# Patient Record
Sex: FEMALE | Race: WHITE
[De-identification: names, ages, dates, MRNs, and addresses within clinical notes are randomized per-mention and may not be internally consistent; named-entity substitution may affect disease eponyms.]

---

## 2018-01-20 ENCOUNTER — HOSPITAL ENCOUNTER (EMERGENCY)
Dept: HOSPITAL 17 - NEPD | Age: 63
Discharge: HOME | End: 2018-01-20
Payer: COMMERCIAL

## 2018-01-20 VITALS
RESPIRATION RATE: 14 BRPM | OXYGEN SATURATION: 99 % | TEMPERATURE: 98.2 F | SYSTOLIC BLOOD PRESSURE: 147 MMHG | HEART RATE: 64 BPM | DIASTOLIC BLOOD PRESSURE: 69 MMHG

## 2018-01-20 VITALS — WEIGHT: 147.71 LBS | BODY MASS INDEX: 27.18 KG/M2 | HEIGHT: 62 IN

## 2018-01-20 DIAGNOSIS — M79.671: Primary | ICD-10-CM

## 2018-01-20 DIAGNOSIS — E78.00: ICD-10-CM

## 2018-01-20 DIAGNOSIS — Z86.73: ICD-10-CM

## 2018-01-20 DIAGNOSIS — Z87.891: ICD-10-CM

## 2018-01-20 DIAGNOSIS — M19.90: ICD-10-CM

## 2018-01-20 PROCEDURE — 99283 EMERGENCY DEPT VISIT LOW MDM: CPT

## 2018-01-20 NOTE — PD
HPI


.


Foot pain


Chief Complaint:  Pain: Acute or Chronic


Time Seen by Provider:  10:46


Travel History


International Travel<30 days:  No


Contact w/Intl Traveler<30days:  No


Traveled to known affect area:  No





History of Present Illness


HPI


Patient presents with right foot pain.  It is atraumatic.  Onset was over a 

week ago.  Pain is exacerbated by standing and relieved by rest.  She describes 

a pounding pain which she rates 8/10.  Pain is unrelieved by over-the-counter 

Tylenol and Advil.  Patient reports that she was seen at urgent care 

approximately a week ago and had negative x-rays.  Pain has persisted and 

worsened causing her to present us today.  She has not seen her primary care 

provider.





[facility]


Past Medical History


Arthritis:  Yes


Blood Disorders:  No


Cancer:  No


Cardiovascular Problems:  No


High Cholesterol:  Yes


Chest Pain:  Yes


Cerebrovascular Accident:  Yes (JAN 2007)


Diabetes:  No


Endocrine:  No


Genitourinary:  No


Hepatitis:  No


Hiatal Hernia:  No


Immune Disorder:  No


Musculoskeletal:  Yes (ARTHRITIS IN HANDS)


Neurologic:  Yes (TIA X 2, LAST IN 2007)


Psychiatric:  Yes (CLAUSTROPHOBIC)


Reproductive:  No


Respiratory:  No


Thyroid Disease:  No


Tetanus Vaccination:  Unknown


Influenza Vaccination:  Yes





Past Surgical History


AICD:  No


Body Medical Devices:  SCREWS MONICO TOES


Gynecologic Surgery:  Yes (OVARIAN CYST SURGERY 1971)


Joint Replacement:  No


Pacemaker:  No


Other Surgery:  Yes





Social History


Alcohol Use:  No


Tobacco Use:  No (QUIT IN JAN)


Substance Use:  No





Allergies-Medications


(Allergen,Severity, Reaction):  


Coded Allergies:  


     No Known Allergies (Verified  Adverse Reaction, Unknown, 1/20/18)


Reported Meds & Prescriptions





Reported Meds & Active Scripts


Active


Nabumetone 500 Mg Tab 500 Mg PO BID








Review of Systems


Except as stated in HPI:  all other systems reviewed are Neg





Physical Exam


Narrative


GENERAL: Awake and alert and in no acute distress.  Using the FLACC behavior 

pain scale, her objective pain scale is 1/10.  She grimaced when I palpated her 

calcaneus.


SKIN: Warm and dry.  Normal skin color.


HEAD: Normocephalic/atraumatic.


EYES: Pupils are equal.  Extraocular movements are intact.


NECK: Normal range of motion.


CARDIOVASCULAR: Regular rate and rhythm.


RESPIRATORY: Nonlabored respirations.


MUSCULOSKELETAL: Right foot has no appreciable swelling.  She has tenderness to 

the lateral aspect of the calcaneus.  She also has some tenderness in the 

Achilles tendon.  Calf is nontender.  Log rolling of the hip causes no pain and 

movement of the knee causes no pain.


NEUROLOGICAL: Nonfocal.


PSYCHIATRIC: Appropriate mood and affect.





Data


Data


Last Documented VS





Vital Signs








  Date Time  Temp Pulse Resp B/P (MAP) Pulse Ox O2 Delivery O2 Flow Rate FiO2


 


1/20/18 10:47   17     


 


1/20/18 10:35 98.2 64  147/69 (95) 99   








Orders





 Orders


Ed Discharge Order (1/20/18 10:56)








MDM


Medical Decision Making


Medical Screen Exam Complete:  Yes


Emergency Medical Condition:  Yes


Differential Diagnosis


Differential diagnosis of joint pain includes but is not limited to arthritis, 

gout, sprain/strain, fracture, dislocation, bursitis


Narrative Course


This patient presents with atraumatic pain in her right heel.  The pain is in 

the lateral heel and not on the plantar aspect of the heel.  She has had a 

previous negative x-ray per her report.  She has no physical exam findings 

concerning for acute neurovascular problem.  She will be discharged with a 

prescription for Relafen and a referral to podiatry.





Diagnosis





 Primary Impression:  


 Right foot pain


Referrals:  


Guilherme Pappas DPM


3 days


Patient Instructions:  General Instructions, RICE Therapy (ED)


Departure Forms:  Tests/Procedures


Scripts


Nabumetone (Nabumetone) 500 Mg Tab


500 MG PO BID for Pain-Inflammation, #60 TAB 0 Refills


   Prov: Radha Hernandez MD         1/20/18


Disposition:  01 DISCHARGE HOME


Condition:  Stable











Radha Hernandez MD Jan 20, 2018 11:02

## 2018-04-17 ENCOUNTER — HOSPITAL ENCOUNTER (INPATIENT)
Dept: HOSPITAL 17 - PHED | Age: 63
LOS: 1 days | Discharge: HOME | DRG: 176 | End: 2018-04-18
Attending: HOSPITALIST | Admitting: HOSPITALIST
Payer: COMMERCIAL

## 2018-04-17 VITALS
SYSTOLIC BLOOD PRESSURE: 141 MMHG | RESPIRATION RATE: 16 BRPM | OXYGEN SATURATION: 95 % | HEART RATE: 67 BPM | DIASTOLIC BLOOD PRESSURE: 64 MMHG | TEMPERATURE: 97.7 F

## 2018-04-17 VITALS
OXYGEN SATURATION: 96 % | RESPIRATION RATE: 17 BRPM | DIASTOLIC BLOOD PRESSURE: 101 MMHG | TEMPERATURE: 97.6 F | SYSTOLIC BLOOD PRESSURE: 142 MMHG | HEART RATE: 69 BPM

## 2018-04-17 VITALS
HEART RATE: 64 BPM | OXYGEN SATURATION: 95 % | SYSTOLIC BLOOD PRESSURE: 108 MMHG | DIASTOLIC BLOOD PRESSURE: 65 MMHG | RESPIRATION RATE: 18 BRPM

## 2018-04-17 VITALS — HEIGHT: 62 IN | WEIGHT: 150.8 LBS | BODY MASS INDEX: 27.75 KG/M2

## 2018-04-17 VITALS
RESPIRATION RATE: 18 BRPM | OXYGEN SATURATION: 96 % | DIASTOLIC BLOOD PRESSURE: 68 MMHG | HEART RATE: 62 BPM | SYSTOLIC BLOOD PRESSURE: 114 MMHG

## 2018-04-17 VITALS
DIASTOLIC BLOOD PRESSURE: 66 MMHG | OXYGEN SATURATION: 93 % | RESPIRATION RATE: 16 BRPM | SYSTOLIC BLOOD PRESSURE: 120 MMHG | TEMPERATURE: 97 F | HEART RATE: 84 BPM

## 2018-04-17 VITALS
HEART RATE: 77 BPM | RESPIRATION RATE: 18 BRPM | DIASTOLIC BLOOD PRESSURE: 78 MMHG | SYSTOLIC BLOOD PRESSURE: 128 MMHG | OXYGEN SATURATION: 96 %

## 2018-04-17 VITALS — OXYGEN SATURATION: 96 %

## 2018-04-17 DIAGNOSIS — Z87.442: ICD-10-CM

## 2018-04-17 DIAGNOSIS — E87.6: ICD-10-CM

## 2018-04-17 DIAGNOSIS — M19.90: ICD-10-CM

## 2018-04-17 DIAGNOSIS — I26.99: Primary | ICD-10-CM

## 2018-04-17 DIAGNOSIS — R07.89: ICD-10-CM

## 2018-04-17 DIAGNOSIS — Z86.73: ICD-10-CM

## 2018-04-17 DIAGNOSIS — N39.0: ICD-10-CM

## 2018-04-17 DIAGNOSIS — M54.9: ICD-10-CM

## 2018-04-17 DIAGNOSIS — R11.0: ICD-10-CM

## 2018-04-17 DIAGNOSIS — G89.29: ICD-10-CM

## 2018-04-17 DIAGNOSIS — E78.00: ICD-10-CM

## 2018-04-17 LAB
ALBUMIN SERPL-MCNC: 3.8 GM/DL (ref 3.4–5)
ALP SERPL-CCNC: 90 U/L (ref 45–117)
ALT SERPL-CCNC: 49 U/L (ref 10–53)
AST SERPL-CCNC: 22 U/L (ref 15–37)
BASOPHILS # BLD AUTO: 0.1 TH/MM3 (ref 0–0.2)
BASOPHILS NFR BLD: 0.8 % (ref 0–2)
BILIRUB SERPL-MCNC: 0.5 MG/DL (ref 0.2–1)
BUN SERPL-MCNC: 20 MG/DL (ref 7–18)
CALCIUM SERPL-MCNC: 9.1 MG/DL (ref 8.5–10.1)
CHLORIDE SERPL-SCNC: 107 MEQ/L (ref 98–107)
COLOR UR: YELLOW
CREAT SERPL-MCNC: 0.87 MG/DL (ref 0.5–1)
EOSINOPHIL # BLD: 0.1 TH/MM3 (ref 0–0.4)
EOSINOPHIL NFR BLD: 0.9 % (ref 0–4)
ERYTHROCYTE [DISTWIDTH] IN BLOOD BY AUTOMATED COUNT: 13.1 % (ref 11.6–17.2)
GFR SERPLBLD BASED ON 1.73 SQ M-ARVRAT: 66 ML/MIN (ref 89–?)
GLUCOSE SERPL-MCNC: 117 MG/DL (ref 74–106)
GLUCOSE UR STRIP-MCNC: (no result) MG/DL
HCO3 BLD-SCNC: 29.1 MEQ/L (ref 21–32)
HCT VFR BLD CALC: 40.9 % (ref 35–46)
HGB BLD-MCNC: 13.9 GM/DL (ref 11.6–15.3)
HGB UR QL STRIP: (no result)
INR PPP: 1.1 RATIO
KETONES UR STRIP-MCNC: (no result) MG/DL
LEUKOCYTE ESTERASE UR QL STRIP: (no result) /HPF (ref 0–5)
LYMPHOCYTES # BLD AUTO: 2.3 TH/MM3 (ref 1–4.8)
LYMPHOCYTES NFR BLD AUTO: 24.1 % (ref 9–44)
MCH RBC QN AUTO: 30.8 PG (ref 27–34)
MCHC RBC AUTO-ENTMCNC: 34 % (ref 32–36)
MCV RBC AUTO: 90.4 FL (ref 80–100)
MONOCYTE #: 0.6 TH/MM3 (ref 0–0.9)
MONOCYTES NFR BLD: 6.3 % (ref 0–8)
MUCOUS THREADS #/AREA URNS LPF: (no result) /LPF
NEUTROPHILS # BLD AUTO: 6.4 TH/MM3 (ref 1.8–7.7)
NEUTROPHILS NFR BLD AUTO: 67.9 % (ref 16–70)
NITRITE UR QL STRIP: (no result)
PLATELET # BLD: 289 TH/MM3 (ref 150–450)
PMV BLD AUTO: 7 FL (ref 7–11)
PROT SERPL-MCNC: 7.7 GM/DL (ref 6.4–8.2)
PROTHROMBIN TIME: 11.4 SEC (ref 9.8–11.6)
RBC # BLD AUTO: 4.53 MIL/MM3 (ref 4–5.3)
RBC #/AREA URNS HPF: (no result) /HPF (ref 0–3)
SODIUM SERPL-SCNC: 142 MEQ/L (ref 136–145)
SP GR UR STRIP: (no result) (ref 1–1.03)
SQUAMOUS #/AREA URNS HPF: (no result) /HPF (ref 0–5)
URINE LEUKOCYTE ESTERASE: (no result)
WBC # BLD AUTO: 9.5 TH/MM3 (ref 4–11)
WHITE BLOOD CELL CLUMPS: (no result)

## 2018-04-17 PROCEDURE — 87086 URINE CULTURE/COLONY COUNT: CPT

## 2018-04-17 PROCEDURE — 85025 COMPLETE CBC W/AUTO DIFF WBC: CPT

## 2018-04-17 PROCEDURE — 93306 TTE W/DOPPLER COMPLETE: CPT

## 2018-04-17 PROCEDURE — 96374 THER/PROPH/DIAG INJ IV PUSH: CPT

## 2018-04-17 PROCEDURE — 81001 URINALYSIS AUTO W/SCOPE: CPT

## 2018-04-17 PROCEDURE — 80053 COMPREHEN METABOLIC PANEL: CPT

## 2018-04-17 PROCEDURE — 96375 TX/PRO/DX INJ NEW DRUG ADDON: CPT

## 2018-04-17 PROCEDURE — 83690 ASSAY OF LIPASE: CPT

## 2018-04-17 PROCEDURE — 96361 HYDRATE IV INFUSION ADD-ON: CPT

## 2018-04-17 PROCEDURE — 71275 CT ANGIOGRAPHY CHEST: CPT

## 2018-04-17 PROCEDURE — 85730 THROMBOPLASTIN TIME PARTIAL: CPT

## 2018-04-17 PROCEDURE — 85610 PROTHROMBIN TIME: CPT

## 2018-04-17 PROCEDURE — 74177 CT ABD & PELVIS W/CONTRAST: CPT

## 2018-04-17 RX ADMIN — Medication SCH ML: at 20:36

## 2018-04-17 NOTE — PD
HPI


Chief Complaint:  Abdominal Pain


Time Seen by Provider:  13:56


Travel History


International Travel<30 days:  No


Contact w/Intl Traveler<30days:  No


Traveled to known affect area:  No





History of Present Illness


HPI


This 62-year-old female is complaining of left flank pain.  Drove 14 hours in a 

car yesterday she is has chronic back pain.  She had an MRI which showed that 

she has bulging disks is and is going to see a doctor soon about this.  She 

started getting nauseated this morning while she was at work.  Around noon she 

started having left lower quadrant and left flank pain.  She has not had any 

dysuria.  She has no history of abdominal surgery.  She is a bit nauseated she 

is not aware of fever or chills





[facility]


Past Medical History


Arthritis:  Yes


Blood Disorders:  No


Cancer:  No


Cardiovascular Problems:  No


High Cholesterol:  Yes


Chest Pain:  Yes


Cerebrovascular Accident:  Yes (cva x2)


Diabetes:  No


Endocrine:  No


Genitourinary:  No


Hepatitis:  No


Hiatal Hernia:  No


Immune Disorder:  No


Musculoskeletal:  Yes (ARTHRITIS IN HANDS)


Neurologic:  Yes (TIA X 2, LAST IN 2007)


Psychiatric:  Yes (CLAUSTROPHOBIC)


Reproductive:  No


Respiratory:  No


Thyroid Disease:  No


Pregnant?:  Not Pregnant





Past Surgical History


AICD:  No


Body Medical Devices:  SCREWS MONICO TOES


Gynecologic Surgery:  Yes (OVARIAN CYST SURGERY 1971)


Joint Replacement:  No


Pacemaker:  No


Other Surgery:  Yes (SCREWS IN TOES)





Social History


Alcohol Use:  No


Tobacco Use:  No (QUIT IN JAN)


Substance Use:  No





Allergies-Medications


(Allergen,Severity, Reaction):  


Coded Allergies:  


     No Known Allergies (Verified  Adverse Reaction, Unknown, 4/17/18)


Reported Meds & Prescriptions





Reported Meds & Active Scripts


Active


No Active Prescriptions or Reported Medications    








Review of Systems


General / Constitutional:  No: Fever, Chills


Eyes:  No: Diploplia, Blurred Vision


HENT:  No: Headaches, Vertigo


Cardiovascular:  No: Chest Pain or Discomfort, Palpitations


Respiratory:  No: Cough, Shortness of Breath


Gastrointestinal:  Positive: Nausea, Abdominal Pain, No: Vomiting


Genitourinary:  No: Urgency, Frequency


Musculoskeletal:  No: Myalgias, Arthralgias


Skin:  No Rash


Neurologic:  No: Weakness


Hematologic/Lymphatic:  No: Easy Bruising





Physical Exam


Narrative


GENERAL: Well developed female


SKIN: Focused skin assessment warm/dry.


HEAD: Atraumatic. Normocephalic. 


EYES: Pupils equal and round. No scleral icterus. No injection or drainage. 


ENT: No nasal bleeding or discharge.  Mucous membranes pink and moist.


NECK: Trachea midline. No JVD. 


CARDIOVASCULAR: Regular rate and rhythm.  No murmur appreciated.


RESPIRATORY: No accessory muscle use. Clear to auscultation. Breath sounds 

equal bilaterally. 


GASTROINTESTINAL: Abdomen soft, non-tender, nondistended. Hepatic and splenic 

margins not palpable.  Site of pain is the left lower quadrant and left flank 

area.  Palpation does not appear to aggravate the pain very much


MUSCULOSKELETAL: No obvious deformities. No clubbing.  No cyanosis.  No edema. 


NEUROLOGICAL: Awake and alert. No obvious cranial nerve deficits.  Motor 

grossly within normal limits. Normal speech.


PSYCHIATRIC: Appropriate mood and affect; insight and judgment normal.





Data


Data


Last Documented VS





Vital Signs








  Date Time  Temp Pulse Resp B/P (MAP) Pulse Ox O2 Delivery O2 Flow Rate FiO2


 


4/17/18 13:21  77 18 128/78 (95) 96 Room Air  


 


4/17/18 12:59 97.7       








Orders





 Orders


Complete Blood Count With Diff (4/17/18 13:09)


Comprehensive Metabolic Panel (4/17/18 13:09)


Urinalysis - C+S If Indicated (4/17/18 13:09)


Iv Access Insert/Monitor (4/17/18 13:09)


Oxygen Administration (4/17/18 13:09)


Oximetry (4/17/18 13:09)


Lipase (4/17/18 13:09)


Sodium Chlor 0.9% 1000 Ml Inj (Ns 1000 M (4/17/18 14:00)


Ondansetron Inj (Zofran Inj) (4/17/18 14:00)


Ketorolac Inj (Toradol Inj) (4/17/18 14:00)


Potassium Chloride (Kcl) (4/17/18 14:00)


Sodium Chlor 0.9% 1000 Ml Inj (Ns 1000 M (4/17/18 15:15)


Ct Abd/Pel W Iv Contrast(Rout) (4/17/18 15:03)





Labs





Laboratory Tests








Test


  4/17/18


13:18


 


White Blood Count 9.5 TH/MM3 


 


Red Blood Count 4.53 MIL/MM3 


 


Hemoglobin 13.9 GM/DL 


 


Hematocrit 40.9 % 


 


Mean Corpuscular Volume 90.4 FL 


 


Mean Corpuscular Hemoglobin 30.8 PG 


 


Mean Corpuscular Hemoglobin


Concent 34.0 % 


 


 


Red Cell Distribution Width 13.1 % 


 


Platelet Count 289 TH/MM3 


 


Mean Platelet Volume 7.0 FL 


 


Neutrophils (%) (Auto) 67.9 % 


 


Lymphocytes (%) (Auto) 24.1 % 


 


Monocytes (%) (Auto) 6.3 % 


 


Eosinophils (%) (Auto) 0.9 % 


 


Basophils (%) (Auto) 0.8 % 


 


Neutrophils # (Auto) 6.4 TH/MM3 


 


Lymphocytes # (Auto) 2.3 TH/MM3 


 


Monocytes # (Auto) 0.6 TH/MM3 


 


Eosinophils # (Auto) 0.1 TH/MM3 


 


Basophils # (Auto) 0.1 TH/MM3 


 


CBC Comment DIFF FINAL 


 


Differential Comment  


 


Blood Urea Nitrogen 20 MG/DL 


 


Creatinine 0.87 MG/DL 


 


Random Glucose 117 MG/DL 


 


Total Protein 7.7 GM/DL 


 


Albumin 3.8 GM/DL 


 


Calcium Level 9.1 MG/DL 


 


Alkaline Phosphatase 90 U/L 


 


Aspartate Amino Transf


(AST/SGOT) 22 U/L 


 


 


Alanine Aminotransferase


(ALT/SGPT) 49 U/L 


 


 


Total Bilirubin 0.5 MG/DL 


 


Sodium Level 142 MEQ/L 


 


Potassium Level 3.4 MEQ/L 


 


Chloride Level 107 MEQ/L 


 


Carbon Dioxide Level 29.1 MEQ/L 


 


Anion Gap 6 MEQ/L 


 


Estimat Glomerular Filtration


Rate 66 ML/MIN 


 


 


Lipase 89 U/L 











[facility]


Medical Decision Making


Medical Screen Exam Complete:  Yes


Emergency Medical Condition:  Yes


Medical Record Reviewed:  Yes


Differential Diagnosis


Differential includes renal colic, diverticulitis, nonspecific abdominal pain


Narrative Course


White count is 9.5.  A CT scan has been ordered to further assess etiology of 

the pain





Diagnosis





 Primary Impression:  


 Abdominal pain


Scripts


No Active Prescriptions or Reported Meds











Ernie Bradley MD Apr 17, 2018 14:03

## 2018-04-17 NOTE — RADRPT
EXAM DATE/TIME:  04/17/2018 15:18 

 

[facility] COMPARISON:     

No previous studies available for comparison.

 

 

INDICATIONS :     

Left lower quadrant pain today with chronic bilateral lower back pain.

                      

 

IV CONTRAST:     

90 cc Omnipaque 350 (iohexol) IV 

 

 

ORAL CONTRAST:      

No oral contrast ingested.

                      

 

RADIATION DOSE:     

9.65 CTDIvol (mGy) 

 

 

MEDICAL HISTORY :     

Cerebrovascular disease.  

 

SURGICAL HISTORY :      

None. 

 

ENCOUNTER:      

Initial

 

ACUITY:      

1 day

 

PAIN SCALE:      

5/10

 

LOCATION:       

Left lower quadrant 

 

TECHNIQUE:     

Volumetric scanning of the abdomen and pelvis was performed.  Using automated exposure control and ad
justment of the mA and/or kV according to patient size, radiation dose was kept as low as reasonably 
achievable to obtain optimal diagnostic quality images.  DICOM format image data is available electro
nically for review and comparison.  

 

FINDINGS:     

 

LOWER LUNGS:     

Minimal bibasilar ground glass opacities right reflecting atelectasis. There are filling defects note
d in the segmental or right lower lobe pulmonary artery branches concerning for pulmonary embolism.

 

LIVER:     

Diffusely decreased hepatic density without volume loss, focal mass or intrahepatic ductal dilatation
. Gallbladder is unremarkable by CT.

 

SPLEEN:     

Normal size without lesion.

 

PANCREAS:     

Within normal limits.

 

KIDNEYS:     

There is mild prominence of the central renal collecting system with diffuse mild hydroureter extendi
ng to the UVJ. There is a punctate calcified density in the bladder near the UV junction which may re
flect a recently passed calculus. No additional radiopaque renal calculi are demonstrated. Kidneys ar
e otherwise unremarkable.

 

ADRENAL GLANDS:     

Within normal limits.

 

VASCULAR:     

Diffuse atherosclerotic calcifications of the infrarenal aorta without aneurysm or significant stenos
is.

 

BOWEL/MESENTERY:     

Sigmoid diverticulosis without significant inflammatory change to suggest diverticulitis. Appendix is
 visualized and normal in appearance. Bowel is unremarkable without evidence for obstruction. No pneu
matosis or free air. No drainable fluid collections or ascites.

 

ABDOMINAL WALL:     

Within normal limits.

 

RETROPERITONEUM:     

There is no lymphadenopathy. 

 

BLADDER:     

Again, punctate calcified density in the posterior left bladder near the UV junction. Bladder is othe
rwise unremarkable.

 

REPRODUCTIVE:     

Within normal limits.

 

INGUINAL:     

There is no lymphadenopathy or hernia. 

 

MUSCULOSKELETAL:     

Within normal limits for patient age. 

 

CONCLUSION:

     

1. Partially imaged filling defects in the segmental branches of the right lower lobe pulmonary arter
y concerning for pulmonary artery embolism. Correlation for pulmonary artery embolism risk factors is
 recommended. Recommend further evaluation with CT angiogram as clinically warranted.

2. Punctate calcified density in the bladder likely just beyond the UV junction with mild left-sided 
hydroureteronephrosis. Overall findings are most consistent with a recently passed calculus. No addit
ional radiopaque renal calculi.

3. Normal appendix. 

 

 Findings were personally discussed with Dr. Gerard.

 

 

 

 Antwan Harding MD on April 17, 2018 at 15:38           

Board Certified Radiologist.

 This report was verified electronically.

## 2018-04-17 NOTE — RADRPT
EXAM DATE/TIME:  04/17/2018 16:15 

 

[facility] COMPARISON:     

No previous studies available for comparison.

 

 

INDICATIONS :     

***Abnormal CT abdomen/pelvis done today.

                      

 

IV CONTRAST:     

50 cc Omnipaque 350 (iohexol) IV 

 

 

RADIATION DOSE:     

10.61 CTDIvol (mGy) 

 

 

MEDICAL HISTORY :     

Cerebrovascular disease.  

 

SURGICAL HISTORY :      

None. 

 

ENCOUNTER:      

Subsequent

 

ACUITY:      

1 day

 

PAIN SCALE:      

0/10

 

LOCATION:        

chest 

 

TECHNIQUE:     

Volumetric scanning of the chest was performed using a pulmonary embolism protocol MIP images were re
constructed.  Using automated exposure control and adjustment of the mA and/or kV according to patien
t size, radiation dose was kept as low as reasonably achievable to obtain optimal diagnostic quality 
images.   DICOM format image data is available electronically for review and comparison.  

 

Follow-up recommendations for detected pulmonary nodules are based at a minimum on nodule size and pa
tient risk factors according to Fleischner Society Guidelines.

 

FINDINGS:     

 

PULMONARY ARTERIES: 

Multiple filling defects are identified in the proximal lobar branches of both lungs.

 

LUNGS:     

Subsegmental air space disease is identified posteriorly in the right lower lobe.

 

PLEURAE:     

There is no pleural thickening or pleural effusion.

 

MEDIASTINUM:     

There is good visualization of the great vessels of the middle mediastinum.  No evidence of mediastin
al or hilar adenopathy/mass.

 

MUSCULOSKELETAL:     

Within normal limits for patient age.

 

MISCELLANEOUS:     

The visualized upper abdominal organs demonstrate no acute abnormality.

 

CONCLUSION:     

1. Bilateral pulmonary emboli

2. Subsegmental airspace disease right lower lobe

 

 

 

 

 Carroll Valente MD on April 17, 2018 at 16:29           

Board Certified Radiologist.

 This report was verified electronically.

## 2018-04-17 NOTE — HHI.HP
__________________________________________________





[facility]


Service


[facility]


Primary Care Physician


Non-Staff


Admission Diagnosis





Bilateral pulmonary embolism, nephrolithiasis


Diagnoses:  


Chief Complaint:  


abd pain


Travel History


International Travel<30 Days:  No


Contact w/Intl Traveler <30 Da:  No


Traveled to Known Affected Are:  No


History of Present Illness


Patient seen in ER for complaints of abdominal pain after a long car ride from 

[place] to [place].  This is a total of 10 hours.  She has some dysuria and 

frequency.  She appears to have urinary tract infection has spastic renal 

stone.  In evaluation of this patient's abdominal complaint patient did have 

findings of pulmonary arterial thrombosis.  Patient has never had a PE or 

venous normal embolic event but has had several TIAs in the past.  She has 

family history of cancer and a personal history of cancer.  Have just written 

this long 10 hour ride in a car certainly does put her at higher risk for 

thromboembolic event.  For this reason the in medical team was called for 

further admission orders and the patient plan of care discussed with her and 

her sister at bedside.  She will be admitted to the hospital for further 

treatment of acute pulmonary embolism.  She is not short of breath.  She is 

normotensive.  She has a vague chest discomfort.





Review of Systems


Constitutional:  DENIES: Diaphoretic episodes, Fatigue, Fever, Weight gain, 

Weight loss, Chills, Dizziness, Change in appetite, Night Sweats


Endocrine:  DENIES: Abnorml menstrual pattern, Heat/cold intolerance, Polydipsia

, Polyuria, Polyphagia


Eyes:  DENIES: Blurred vision, Diplopia, Eye inflammation, Eye pain, Vision loss

, Photosensitivity, Double Vision


Ears, nose, mouth, throat:  DENIES: Tinnitus, Hearing loss, Vertigo, Nasal 

discharge, Oral lesions, Throat pain, Hoarseness, Ear Pain, Running Nose, 

Epistaxis, Sinus Pain, Toothache, Odynophagia


Respiratory:  DENIES: Apneas, Cough, Snoring, Wheezing, Hemoptysis, Sputum 

production, Shortness of breath


Cardiovascular:  DENIES: Chest pain, Palpitations, Syncope, Dyspnea on Exertion

, PND, Lower Extremity Edema, Orthopnea, Claudication


Gastrointestinal:  COMPLAINS OF: Abdominal pain, DENIES: Black stools, Bloody 

stools, Constipation, Diarrhea, Nausea, Vomiting, Difficulty Swallowing, 

Anorexia


Genitourinary:  DENIES: Abnormal vaginal bleeding, Dysmenorrhea, Dyspareunia, 

Sexual dysfunction, Urinary frequency, Urinary incontinence, Urgency, Hematuria

, Dysuria, Nocturia, Vaginal discharge


Musculoskeletal:  DENIES: Joint pain, Muscle aches, Stiffness, Joint Swelling, 

Back pain, Neck pain


Integumentary:  DENIES: Abnormal pigmentation, Pruritus, Rash, Nail changes, 

Breast masses, Breast skin changes, Nipple discharge


Hematologic/lymphatic:  DENIES: Bruising, Lymphadenopathy


Immunologic/allergic:  DENIES: Eczema, Urticaria


Neurologic:  DENIES: Abnormal gait, Headache, Localized weakness, Paresthesias, 

Seizures, Speech Problems, Tremor, Poor Balance


Psychiatric:  DENIES: Anxiety, Confusion, Mood changes, Depression, 

Hallucinations, Agitation, Suicidal Ideation, Homicidal Ideation, Delusions


Except as stated in HPI:  all other systems reviewed are Neg





Past Family Social History


Past Medical History


tia, no residual


OA


Past Surgical History


ovarian cyst


foot


Reported Medications


none


Allergies:  


Coded Allergies:  


     No Known Allergies (Verified  Adverse Reaction, Unknown, 18)


Active Ordered Medications


reviewed in the EMR


Family History


multiple malignancies


Social History


NO tobacco, etoh


single





Physical Exam


Vital Signs





Vital Signs








  Date Time  Temp Pulse Resp B/P (MAP) Pulse Ox O2 Delivery O2 Flow Rate FiO2


 


18 15:40  64 18 108/65 (79) 95 Room Air  


 


18 13:21  77 18 128/78 (95) 96 Room Air  


 


18 13:20     96 Room Air  


 


18 13:20     96 Room Air  


 


18 12:59 97.7 67 16 141/64 (89) 95   








Physical Exam


GENERAL: This is a well-nourished, well-developed patient, in no apparent 

distress.


SKIN: No rashes, ecchymoses or lesions. Cool and dry.


HEAD: Atraumatic. Normocephalic. No temporal or scalp tenderness.


EYES: Pupils equal round and reactive. Extraocular motions intact. No scleral 

icterus. No injection or drainage. 


ENT: Nose without bleeding, purulent drainage or septal hematoma. Throat 

without erythema, tonsillar hypertrophy or exudate. Uvula midline. Airway 

patent.


NECK: Trachea midline. No JVD or lymphadenopathy. Supple, nontender, no 

meningeal signs.


CARDIOVASCULAR: Regular rate and rhythm without murmurs, gallops, or rubs. 


RESPIRATORY: Clear to auscultation. Breath sounds equal bilaterally. No wheezes

, rales, or rhonchi.  


GASTROINTESTINAL: Abdomen soft, non-tender, nondistended. No hepato-splenomegaly

, or palpable masses. No guarding.


MUSCULOSKELETAL: Extremities without clubbing, cyanosis, or edema. No joint 

tenderness, effusion, or edema noted. No calf tenderness. Negative Homans sign 

bilaterally.


NEUROLOGICAL: Awake and alert. Cranial nerves II through XII intact.  Motor and 

sensory grossly within normal limits. Five out of 5 muscle strength in all 

muscle groups.  Normal speech.


Laboratory





Laboratory Tests








Test


  18


13:18 18


15:40


 


White Blood Count 9.5  


 


Red Blood Count 4.53  


 


Hemoglobin 13.9  


 


Hematocrit 40.9  


 


Mean Corpuscular Volume 90.4  


 


Mean Corpuscular Hemoglobin 30.8  


 


Mean Corpuscular Hemoglobin


Concent 34.0 


  


 


 


Red Cell Distribution Width 13.1  


 


Platelet Count 289  


 


Mean Platelet Volume 7.0  


 


Neutrophils (%) (Auto) 67.9  


 


Lymphocytes (%) (Auto) 24.1  


 


Monocytes (%) (Auto) 6.3  


 


Eosinophils (%) (Auto) 0.9  


 


Basophils (%) (Auto) 0.8  


 


Neutrophils # (Auto) 6.4  


 


Lymphocytes # (Auto) 2.3  


 


Monocytes # (Auto) 0.6  


 


Eosinophils # (Auto) 0.1  


 


Basophils # (Auto) 0.1  


 


CBC Comment DIFF FINAL  


 


Differential Comment   


 


Blood Urea Nitrogen 20  


 


Creatinine 0.87  


 


Random Glucose 117  


 


Total Protein 7.7  


 


Albumin 3.8  


 


Calcium Level 9.1  


 


Alkaline Phosphatase 90  


 


Aspartate Amino Transf


(AST/SGOT) 22 


  


 


 


Alanine Aminotransferase


(ALT/SGPT) 49 


  


 


 


Total Bilirubin 0.5  


 


Sodium Level 142  


 


Potassium Level 3.4  


 


Chloride Level 107  


 


Carbon Dioxide Level 29.1  


 


Anion Gap 6  


 


Estimat Glomerular Filtration


Rate 66 


  


 


 


Lipase 89  


 


Urine Color  YELLOW 


 


Urine Turbidity  CLEAR 


 


Urine pH  6.0 


 


Urine Specific Gravity


  


  LESS/EQUAL


1.005


 


Urine Protein  NEG 


 


Urine Glucose (UA)  NEG 


 


Urine Ketones  TRACE 


 


Urine Occult Blood  LARGE 


 


Urine Nitrite  NEG 


 


Urine Bilirubin  NEG 


 


Urine Urobilinogen  0.2 


 


Urine Leukocyte Esterase  NEG 


 


Urine RBC  15-19 


 


Urine WBC  6-8 


 


Urine WBC Clumps  FEW 


 


Urine Squamous Epithelial


Cells 


  0-5 


 


 


Urine Mucus  MOD 


 


Microscopic Urinalysis Comment


  


  CULTURE


INDICATED














 Date/Time


Source Procedure


Growth Status


 


 


 18 15:40


Urine Clean Catch Urine Culture


Pending Received








Result Diagram:  


18 1318                                                                   

             18 1318





Imaging





Last Impressions








Abdomen/Pelvis CT 18 1503 Signed





Impressions: 





 Service Date/Time:  2018 15:18 - CONCLUSION:        1. 





 Partially imaged filling defects in the segmental branches of the right lower 





 lobe pulmonary artery concerning for pulmonary artery embolism. Correlation 

for 





 pulmonary artery embolism risk factors is recommended. Recommend further 





 evaluation with CT angiogram as clinically warranted. 2. Punctate calcified 





 density in the bladder likely just beyond the UV junction with mild left-sided 





 hydroureteronephrosis. Overall findings are most consistent with a recently 





 passed calculus. No additional radiopaque renal calculi. 3. Normal appendix.  

  





 Findings were personally discussed with Dr. Gerard.     Antwan Harding MD 


 


CT Angiography 18 0000 Signed





Impressions: 





 Service Date/Time:  2018 16:15 - CONCLUSION:  1. Bilateral 





 pulmonary emboli 2. Subsegmental airspace disease right lower lobe      Carroll Valente MD 











Caprini VTE Risk Assessment


Caprini VTE Risk Assessment:  Mod/High Risk (score >= 2)


VTE Pharm Contraindication:  Coagulopathy,INR elevated


Caprini Risk Assessment Model











 Point Value = 1          Point Value = 2  Point Value = 3  Point Value = 5


 


Age 41-60


Minor surgery


BMI > 25 kg/m2


Swollen legs


Varicose veins


Pregnancy or postpartum


History of unexplained or recurrent


   spontaneous 


Oral contraceptives or hormone


   replacement


Sepsis (< 1 month)


Serious lung disease, including


   pneumonia (< 1 month)


Abnormal pulmonary function


Acute myocardial infarction


Congestive heart failure (< 1 month)


History of inflammatory bowel disease


Medical patient at bed rest Age 61-74


Arthroscopic surgery


Major open surgery (> 45 min)


Laparoscopic surgery (> 45 min)


Malignancy


Confined to bed (> 72 hours)


Immobilizing plaster cast


Central venous access Age >= 75


History of VTE


Family history of VTE


Factor V Leiden


Prothrombin [id number]


Lupus anticoagulant


Anticardiolipin antibodies


Elevated serum homocysteine


Heparin-induced thrombocytopenia


Other congenital or acquired


   thrombophilia Stroke (< 1 month)


Elective arthroplasty


Hip, pelvis, or leg fracture


Acute spinal cord injury (< 1 month)








Prophylaxis Regimen











   Total Risk


Factor Score Risk Level Prophylaxis Regimen


 


0-1      Low Early ambulation


 


2 Moderate Order ONE of the following:


*Sequential Compression Device (SCD)


*Heparin 5000 units SQ BID


 


3-4 Higher Order ONE of the following medications:


*Heparin 5000 units SQ TID


*Enoxaparin/Lovenox 40 mg SQ daily (WT < 150 kg, CrCl > 30 mL/min)


*Enoxaparin/Lovenox 30 mg SQ daily (WT < 150 kg, CrCl > 10-29 mL/min)


*Enoxaparin/Lovenox 30 mg SQ BID (WT < 150 kg, CrCl > 30 mL/min)


AND/OR


*Sequential Compression Device (SCD)


 


5 or more Highest Order ONE of the following medications:


*Heparin 5000 units SQ TID (Preferred with Epidurals)


*Enoxaparin/Lovenox 40 mg SQ daily (WT < 150 kg, CrCl > 30 mL/min)


*Enoxaparin/Lovenox 30 mg SQ daily (WT < 150 kg, CrCl > 10-29 mL/min)


*Enoxaparin/Lovenox 30 mg SQ BID (WT < 150 kg, CrCl > 30 mL/min)


AND


*Sequential Compression Device (SCD)











Assessment and Plan


Problem List:  


(1) Bilateral pulmonary embolism


ICD Code:  I26.99 - Other pulmonary embolism without acute cor pulmonale


Status:  Acute


Plan:  lovenox x 24 hrs


likely Novel anticoagulant





(2) Renal calculus, left


ICD Code:  N20.0 - Calculus of kidney


Status:  Acute


Plan:  recently passed


stable


Rx uti iv rocephin








Physician Certification


2 Midnight Certification Type:  Admission for Inpatient Services


Order for Inpatient Services


The services are ordered in accordance with Medicare regulations or non-

Medicare payer requirements, as applicable.  In the case of services not 

specified as inpatient-only, they are appropriately provided as inpatient 

services in accordance with the 2-midnight benchmark.


Estimated LOS (days):  2


2 days is the estimated time the patient will need to remain in the hospital, 

assuming treatment plan goals are met and no additional complications.


Post-Hospital Plan:  Shelbie Atkinson MD 2018 17:13

## 2018-04-17 NOTE — PD
Physical Exam


Narrative


Received sign out from previous provider to follow up with CT a/p and 

reevaluate.





61yo F here with left flank pain.  Labs reviewed, no leukocytosis.  H/H normal.

  CMP unremarkable except mild hypokalemia at 3.4 which was replaced by 

previous team. Lipase normal.  Pt given zofran, toradol and NS IVF.  CT a/p 

showed partially imaged filling defects in segmental branches of right lower 

lobe pulmonary artery concerning for PE.  I discussed with radiologist and he 

recommends CT angio to further evaluate for PE and hydration.  Pt is receiving 

a second liter of NS IVF.  CT also showed punctate calcified density in bladder 

likely just beyond UV junction with mild left hydroureteronephrosis.  

Consistent with recently passed calculus.  UA showed large blood.  WBC 6-8.  Pt 

given ceftriaxone.  Pt reevaluated at bedside and pain and nausea has improved.

  Pt is saturating at 94% on RA and denies any chest pain or sob.  Denies any 

new bilateral leg swelling or pain.  Pt did just come back from a 10 hour car 

ride from [place] last night and has risk factors so CT angio ordered.  CT 

angio showed bilateral PE.  Pt is hemodynamically stable and given lovenox.  

Discussed with Dr. Lamb and accepted to her service.





Data


Data


Last Documented VS





Vital Signs








  Date Time  Temp Pulse Resp B/P (MAP) Pulse Ox O2 Delivery O2 Flow Rate FiO2


 


4/17/18 15:40  64 18 108/65 (79) 95 Room Air  


 


4/17/18 12:59 97.7       








Orders





 Orders


Complete Blood Count With Diff (4/17/18 13:09)


Comprehensive Metabolic Panel (4/17/18 13:09)


Urinalysis - C+S If Indicated (4/17/18 13:09)


Iv Access Insert/Monitor (4/17/18 13:09)


Oxygen Administration (4/17/18 13:09)


Oximetry (4/17/18 13:09)


Lipase (4/17/18 13:09)


Sodium Chlor 0.9% 1000 Ml Inj (Ns 1000 M (4/17/18 14:00)


Ondansetron Inj (Zofran Inj) (4/17/18 14:00)


Ketorolac Inj (Toradol Inj) (4/17/18 14:00)


Potassium Chloride (Kcl) (4/17/18 14:00)


Sodium Chlor 0.9% 1000 Ml Inj (Ns 1000 M (4/17/18 15:15)


Ct Abd/Pel W Iv Contrast(Rout) (4/17/18 15:03)


Iohexol 350 Inj (Omnipaque 350 Inj) (4/17/18 15:26)


Ct Pulmonary Angiogram (4/17/18 )


Sodium Chlor 0.9% 1000 Ml Inj (Ns 1000 M (4/17/18 16:00)


Urine Culture (4/17/18 15:40)


Iohexol 350 Inj (Omnipaque 350 Inj) (4/17/18 16:23)


Ceftriaxone Inj (Rocephin Inj) (4/17/18 16:45)


Sodium Chlor 0.9% 1000 Ml Inj (Ns 1000 M (4/17/18 16:45)


Enoxaparin Inj (Lovenox Inj) (4/17/18 17:00)


Admit Order (Ed Use Only) (4/17/18 16:55)





Labs





Laboratory Tests








Test


  4/17/18


13:18 4/17/18


15:40


 


White Blood Count 9.5 TH/MM3  


 


Red Blood Count 4.53 MIL/MM3  


 


Hemoglobin 13.9 GM/DL  


 


Hematocrit 40.9 %  


 


Mean Corpuscular Volume 90.4 FL  


 


Mean Corpuscular Hemoglobin 30.8 PG  


 


Mean Corpuscular Hemoglobin


Concent 34.0 % 


  


 


 


Red Cell Distribution Width 13.1 %  


 


Platelet Count 289 TH/MM3  


 


Mean Platelet Volume 7.0 FL  


 


Neutrophils (%) (Auto) 67.9 %  


 


Lymphocytes (%) (Auto) 24.1 %  


 


Monocytes (%) (Auto) 6.3 %  


 


Eosinophils (%) (Auto) 0.9 %  


 


Basophils (%) (Auto) 0.8 %  


 


Neutrophils # (Auto) 6.4 TH/MM3  


 


Lymphocytes # (Auto) 2.3 TH/MM3  


 


Monocytes # (Auto) 0.6 TH/MM3  


 


Eosinophils # (Auto) 0.1 TH/MM3  


 


Basophils # (Auto) 0.1 TH/MM3  


 


CBC Comment DIFF FINAL  


 


Differential Comment   


 


Blood Urea Nitrogen 20 MG/DL  


 


Creatinine 0.87 MG/DL  


 


Random Glucose 117 MG/DL  


 


Total Protein 7.7 GM/DL  


 


Albumin 3.8 GM/DL  


 


Calcium Level 9.1 MG/DL  


 


Alkaline Phosphatase 90 U/L  


 


Aspartate Amino Transf


(AST/SGOT) 22 U/L 


  


 


 


Alanine Aminotransferase


(ALT/SGPT) 49 U/L 


  


 


 


Total Bilirubin 0.5 MG/DL  


 


Sodium Level 142 MEQ/L  


 


Potassium Level 3.4 MEQ/L  


 


Chloride Level 107 MEQ/L  


 


Carbon Dioxide Level 29.1 MEQ/L  


 


Anion Gap 6 MEQ/L  


 


Estimat Glomerular Filtration


Rate 66 ML/MIN 


  


 


 


Lipase 89 U/L  


 


Urine Color  YELLOW 


 


Urine Turbidity  CLEAR 


 


Urine pH  6.0 


 


Urine Specific Gravity


  


  LESS/EQUAL


1.005


 


Urine Protein  NEG mg/dL 


 


Urine Glucose (UA)  NEG mg/dL 


 


Urine Ketones  TRACE mg/dL 


 


Urine Occult Blood  LARGE 


 


Urine Nitrite  NEG 


 


Urine Bilirubin  NEG 


 


Urine Urobilinogen  0.2 MG/DL 


 


Urine Leukocyte Esterase  NEG 


 


Urine RBC  15-19 /hpf 


 


Urine WBC  6-8 /hpf 


 


Urine WBC Clumps  FEW 


 


Urine Squamous Epithelial


Cells 


  0-5 /hpf 


 


 


Urine Mucus  MOD /lpf 


 


Microscopic Urinalysis Comment


  


  CULTURE


INDICATED











MDM


Supervised Visit with AMANDA:  No


Critical Care Narrative


Aggregate critical care time was 40 minutes. Time to perform other separately 

billable procedures was not  


included in the critical care time. My time did not include minutes spent 

treating any other patients simultaneously or on  


activities that did not directly contribute to the patient's treatment.  





The services I provided to this patient were to treat and/or prevent clinically 

significant deterioration that could result  


in:  cardiovascular collapse or death.





I provided critical care services requiring my management, as noted below:


Chart data review, documentation time, medication orders and management, vital 

sign assessments/reviewing monitor data,  


ordering and reviewing lab tests, ordering and interpreting/reviewing x-rays 

and diagnostic studies, care of the patient  


and discussion of the patient with the admitting physicians.


Diagnosis





 Primary Impression:  


 Bilateral pulmonary embolism


 Additional Impression:  


 Renal calculus, left





Admitting Information


Admitting Physician Requests:  Admit


Scripts


No Active Prescriptions or Reported Meds











Cynthia Gerard DO Apr 17, 2018 15:33

## 2018-04-18 VITALS
DIASTOLIC BLOOD PRESSURE: 66 MMHG | SYSTOLIC BLOOD PRESSURE: 121 MMHG | RESPIRATION RATE: 20 BRPM | OXYGEN SATURATION: 95 % | HEART RATE: 69 BPM | TEMPERATURE: 98.7 F

## 2018-04-18 VITALS
HEART RATE: 63 BPM | RESPIRATION RATE: 19 BRPM | TEMPERATURE: 96.9 F | DIASTOLIC BLOOD PRESSURE: 66 MMHG | OXYGEN SATURATION: 95 % | SYSTOLIC BLOOD PRESSURE: 122 MMHG

## 2018-04-18 VITALS
TEMPERATURE: 98.5 F | SYSTOLIC BLOOD PRESSURE: 107 MMHG | HEART RATE: 67 BPM | OXYGEN SATURATION: 93 % | DIASTOLIC BLOOD PRESSURE: 60 MMHG | RESPIRATION RATE: 18 BRPM

## 2018-04-18 VITALS — HEART RATE: 74 BPM

## 2018-04-18 VITALS
DIASTOLIC BLOOD PRESSURE: 66 MMHG | HEART RATE: 62 BPM | TEMPERATURE: 97.8 F | OXYGEN SATURATION: 94 % | RESPIRATION RATE: 18 BRPM | SYSTOLIC BLOOD PRESSURE: 123 MMHG

## 2018-04-18 VITALS — HEART RATE: 59 BPM

## 2018-04-18 RX ADMIN — Medication SCH ML: at 09:13

## 2018-04-18 NOTE — HHI.DCPOC
Discharge Care Plan


Diagnosis:  


(1) Renal calculus, left


(2) Bilateral pulmonary embolism


Goals to Promote Your Health


* To prevent worsening of your condition and complications


* To maintain your health at the optimal level


Directions to Meet Your Goals


*** Take your medications as prescribed


*** Follow your dietary instruction


*** Follow activity as directed








*** Keep your appointments as scheduled


*** Take your immunizations and boosters as scheduled


*** If your symptoms worsen call your PCP, if no PCP go to Urgent Care Center 

or Emergency Room***


*** Smoking is Dangerous to Your Health. Avoid second hand smoke***


***Call the 24-hour hour crisis hotline for domestic abuse at 1-532.576.3413***











Shelbie Lamb MD Apr 18, 2018 11:45

## 2018-04-18 NOTE — HHI.PR
Subjective


Remarks


Patient seen today in follow-up for pulmonary embolism.  No chest pain or 

shortness of breath.  Patient doing well with this issue however did say she 

had some dysuria and thinks she had passed the stone





Objective


Vitals





Vital Signs








  Date Time  Temp Pulse Resp B/P (MAP) Pulse Ox O2 Delivery O2 Flow Rate FiO2


 


4/18/18 08:00 98.7 69 20 121/66 (84) 95   


 


4/18/18 04:00 97.8 62 18 123/66 (85) 94   


 


4/18/18 00:00 98.5 67 18 107/60 (76) 93   


 


4/17/18 20:00  84      


 


4/17/18 20:00 97.0 87 16 120/66 (84) 93   


 


4/17/18 18:00 97.6 69 17 142/101 (115) 96   


 


4/17/18 17:45        


 


4/17/18 17:16  62 18 114/68 (83) 96 Room Air  


 


4/17/18 15:40  64 18 108/65 (79) 95 Room Air  


 


4/17/18 13:21  77 18 128/78 (95) 96 Room Air  


 


4/17/18 13:20     96 Room Air  


 


4/17/18 13:20     96 Room Air  


 


4/17/18 12:59 97.7 67 16 141/64 (89) 95   














I/O      


 


 4/17/18 4/17/18 4/17/18 4/18/18 4/18/18 4/18/18





 07:00 15:00 23:00 07:00 15:00 23:00


 


Intake Total   3100 ml 240 ml 240 ml 


 


Balance   3100 ml 240 ml 240 ml 


 


      


 


Intake Oral    240 ml 240 ml 


 


IV Total   3100 ml   


 


# Voids   1 1  








Result Diagram:  


4/17/18 1318                                                                   

             4/17/18 1318





Objective Remarks


GENERAL: This is a well-nourished, well-developed patient, in no apparent 

distress.


CARDIOVASCULAR: Regular rate and rhythm without murmurs, gallops, or rubs. 


RESPIRATORY: Clear to auscultation. Breath sounds equal bilaterally. No wheezes

, rales, or rhonchi.  


GASTROINTESTINAL: Abdomen soft, non-tender, nondistended. Normal active bowel 

sounds


MUSCULOSKELETAL: Extremities without clubbing, cyanosis, or edema.


NEURO:  Alert & Oriented x4 to person, place, time, situation.  Moves all ext x4





A/P


Problem List:  


(1) Bilateral pulmonary embolism


ICD Code:  I26.99 - Other pulmonary embolism without acute cor pulmonale


Status:  Acute


Plan:  Start Xarelto


Echo pending





(2) Renal calculus, left


ICD Code:  N20.0 - Calculus of kidney


Status:  Acute


Plan:  recently passed


stable


Rx uti iv rocephin





Discharge Planning


Pending echo, likely discharge home


Diet regular


Activity unrestricted


Follow-up PCP in 1 week











Shelbie Lamb MD Apr 18, 2018 11:44

## 2018-04-18 NOTE — ECHRPT
Indication:   Chest pain, unspecified

 

 CONCLUSIONS

 The left ventricular systolic function is normal with an estimated ejection fraction of 55%. 

 Wall thickness is normal. 

 Normal left ventricular size. 

 There is mild tricuspid valve regurgitation. 

 The estimated pulmonary arterial pressure is 34 mmHg.

 

 BP:        /         HR:                          Rhythm:           Sinus

 

 MEASUREMENTS  (Male / Female) Normal Values       Technical Quality:Good

 2D ECHO

 LV Diastolic Diameter PLAX        4.0 cm                4.2 - 5.9 / 3.9 - 5.3 cm

 LV Systolic Diameter PLAX         2.8 cm                

 IVS Diastolic Thickness           0.9 cm                0.6 - 1.0 / 0.6 - 0.9 cm

 LVPW Diastolic Thickness          0.9 cm                0.6 - 1.0 / 0.6 - 0.9 cm

 LV Relative Wall Thickness        0.5                   

 LVOT Diameter                     1.8 cm                

 

 M-MODE

 Aortic Root Diameter MM           3.0 cm                

 LA Systolic Diameter MM           3.3 cm                

 LA Ao Ratio MM                    1.1                   

 AV Cusp Separation MM             2.0 cm                

 

 DOPPLER

 AV Peak Velocity                  144.0 cm/s            

 AV Peak Gradient                  8.3 mmHg              

 LVOT Peak Velocity                87.4 cm/s             

 LVOT Peak Gradient                3.1 mmHg              

 AV Area Cont Eq pk                1.5 cm               

 Mitral E Point Velocity           85.9 cm/s             

 Mitral A Point Velocity           72.1 cm/s             

 Mitral E to A Ratio               1.2                   

 LV E' Lateral Velocity            9.2 cm/s              

 Mitral E to LV E' Lateral Ratio   9.4                   

 LV E' Septal Velocity             8.1 cm/s              

 Mitral E to LV E' Septal Ratio    10.6                  

 TR Peak Velocity                  246.0 cm/s            

 TR Peak Gradient                  24.2 mmHg             

 Right Atrial Pressure             10.0 mmHg             

 Pulmonary Artery Systolic Pressu  34.2 mmHg             

 Right Ventricular Systolic Press  34.2 mmHg             

 PV Peak Velocity                  96.4 cm/s             

 PV Peak Gradient                  3.7 mmHg              

 

 

 FINDINGS

 

 LEFT VENTRICLE

 The left ventricular systolic function is normal with an estimated ejection fraction of 55%. 

 Wall thickness is normal. 

 Normal left ventricular size. 

 

 RIGHT VENTRICLE

 Normal right ventricular size and systolic function.  

 

 LEFT ATRIUM

 The left atrial size is normal.  

 

 RIGHT ATRIUM

 The right atrial size is normal.  

 

 ATRIAL SEPTUM

 Normal atrial septal thickness without atrial level shunting by limited color doppler interrogation.
  

 

 AORTA

 The aortic root and proximal ascending aorta are normal in size on limited imaging.  

 

 MITRAL VALVE

 Structurally normal mitral valve. No mitral valve stenosis or regurgitation.  

 

 AORTIC VALVE

 Trileaflet aortic valve. No aortic valve stenosis or regurgitation.  

 

 TRICUSPID VALVE

 There is mild tricuspid valve regurgitation. 

 The estimated pulmonary arterial pressure is 34.2 mmHg. 

 

 PULMONARY VALVE

 No pulmonary valve regurgitation or stenosis.  

 

 VESSELS

 The inferior vena cava is normal in size.  

 

 PERICARDIUM

 No pericardial effusion.  

 

 

 

 

  Lupis Ruiz MD, FACC

  (Electronically Signed)

  Final Date:18 April 2018 18:04

## 2022-02-22 ENCOUNTER — APPOINTMENT (RX ONLY)
Dept: URBAN - METROPOLITAN AREA CLINIC 52 | Facility: CLINIC | Age: 67
Setting detail: DERMATOLOGY
End: 2022-02-22

## 2022-02-22 DIAGNOSIS — D22 MELANOCYTIC NEVI: ICD-10-CM

## 2022-02-22 DIAGNOSIS — L20.89 OTHER ATOPIC DERMATITIS: ICD-10-CM | Status: WORSENING

## 2022-02-22 PROBLEM — L20.84 INTRINSIC (ALLERGIC) ECZEMA: Status: ACTIVE | Noted: 2022-02-22

## 2022-02-22 PROBLEM — D22.5 MELANOCYTIC NEVI OF TRUNK: Status: ACTIVE | Noted: 2022-02-22

## 2022-02-22 PROCEDURE — ? FULL BODY SKIN EXAM - DECLINED

## 2022-02-22 PROCEDURE — 99204 OFFICE O/P NEW MOD 45 MIN: CPT

## 2022-02-22 PROCEDURE — ? COUNSELING

## 2022-02-22 PROCEDURE — ? PRESCRIPTION

## 2022-02-22 PROCEDURE — ? ADDITIONAL NOTES

## 2022-02-22 PROCEDURE — ? TREATMENT REGIMEN

## 2022-02-22 RX ORDER — TRIAMCINOLONE ACETONIDE 1 MG/G
1 CREAM TOPICAL BID
Qty: 80 | Refills: 2 | Status: ERX

## 2022-02-22 ASSESSMENT — LOCATION SIMPLE DESCRIPTION DERM
LOCATION SIMPLE: LEFT POSTERIOR UPPER ARM
LOCATION SIMPLE: CHEST

## 2022-02-22 ASSESSMENT — LOCATION DETAILED DESCRIPTION DERM
LOCATION DETAILED: RIGHT MEDIAL SUPERIOR CHEST
LOCATION DETAILED: LEFT DISTAL POSTERIOR UPPER ARM

## 2022-02-22 ASSESSMENT — LOCATION ZONE DERM
LOCATION ZONE: TRUNK
LOCATION ZONE: ARM

## 2022-02-22 NOTE — PROCEDURE: MIPS QUALITY
Quality 47: Advance Care Plan: Advance Care Planning discussed and documented; advance care plan or surrogate decision maker documented in the medical record.
Quality 431: Preventive Care And Screening: Unhealthy Alcohol Use - Screening: Patient not identified as an unhealthy alcohol user when screened for unhealthy alcohol use using a systematic screening method
Detail Level: Detailed
Quality 226: Preventive Care And Screening: Tobacco Use: Screening And Cessation Intervention: Patient screened for tobacco use and is an ex/non-smoker
Quality 130: Documentation Of Current Medications In The Medical Record: Current Medications Documented
Quality 111:Pneumonia Vaccination Status For Older Adults: Pneumococcal Vaccination not Administered or Previously Received, Reason not Otherwise Specified

## 2022-02-22 NOTE — PROCEDURE: REASSURANCE
[Walk ___ Months] : Walk: [unfilled] months
[Verbally] : verbally
[FreeTextEntry2] : no
[FreeTextEntry3] : sling
Include Location In Plan?: No
Detail Level: Zone